# Patient Record
Sex: MALE | Race: WHITE | Employment: OTHER | URBAN - METROPOLITAN AREA
[De-identification: names, ages, dates, MRNs, and addresses within clinical notes are randomized per-mention and may not be internally consistent; named-entity substitution may affect disease eponyms.]

---

## 2017-12-21 ENCOUNTER — HOSPITAL ENCOUNTER (EMERGENCY)
Facility: CLINIC | Age: 67
Discharge: HOME | End: 2017-12-21
Attending: EMERGENCY MEDICINE | Admitting: EMERGENCY MEDICINE
Payer: COMMERCIAL

## 2017-12-21 VITALS
SYSTOLIC BLOOD PRESSURE: 136 MMHG | TEMPERATURE: 98 F | RESPIRATION RATE: 18 BRPM | HEART RATE: 100 BPM | OXYGEN SATURATION: 97 % | BODY MASS INDEX: 29.43 KG/M2 | DIASTOLIC BLOOD PRESSURE: 88 MMHG | WEIGHT: 195 LBS

## 2017-12-21 DIAGNOSIS — J20.9 ACUTE BRONCHITIS, UNSPECIFIED ORGANISM: ICD-10-CM

## 2017-12-21 DIAGNOSIS — J01.00 ACUTE MAXILLARY SINUSITIS, RECURRENCE NOT SPECIFIED: ICD-10-CM

## 2017-12-21 PROCEDURE — 99283 EMERGENCY DEPT VISIT LOW MDM: CPT

## 2017-12-21 RX ORDER — ALBUTEROL SULFATE 90 UG/1
2 AEROSOL, METERED RESPIRATORY (INHALATION) EVERY 4 HOURS PRN
Qty: 1 INHALER | Refills: 0 | Status: SHIPPED | OUTPATIENT
Start: 2017-12-21 | End: 2017-12-23

## 2017-12-21 RX ORDER — PREDNISONE 20 MG/1
40 TABLET ORAL DAILY
Qty: 8 TABLET | Refills: 0 | Status: SHIPPED | OUTPATIENT
Start: 2017-12-21 | End: 2017-12-25

## 2017-12-21 ASSESSMENT — ENCOUNTER SYMPTOMS
HEADACHES: 0
NAUSEA: 0
SINUS PRESSURE: 1
SORE THROAT: 0
RHINORRHEA: 1
VOMITING: 0
CHILLS: 0
FEVER: 0
COUGH: 1
DIARRHEA: 0

## 2017-12-21 NOTE — ED AVS SNAPSHOT
[facility] Emergency Department    [address]    [facility] [address]    Phone:  [phone]    Fax:  [phone]                                       Gavin Harper   MRN: [id number]    Department:  [facility] Emergency Department   Date of Visit:  12/21/2017           Patient Information     Date Of Birth          1950        Your diagnoses for this visit were:     Acute maxillary sinusitis, recurrence not specified     Acute bronchitis, unspecified organism        You were seen by Chris Meza MD.      Follow-up Information     Follow up with Jamin Aquino MD In 1 week.    Specialty:  Family Practice    Why:  if no improvement    Contact information:    [address]  [facility] [address] [phone]        24 Hour Appointment Hotline       To make an appointment at any [facility], call [phone] ([phone]). If you don't have a family doctor or clinic, we will help you find one. [place] clinics are conveniently located to serve the needs of you and your family.             Review of your medicines      START taking        Dose / Directions Last dose taken    albuterol 108 (90 BASE) MCG/ACT Inhaler   Commonly known as:  PROAIR HFA/PROVENTIL HFA/VENTOLIN HFA   Dose:  2 puff   Quantity:  1 Inhaler        Inhale 2 puffs into the lungs every 4 hours as needed for shortness of breath / dyspnea or wheezing   Refills:  0        amoxicillin-clavulanate 875-125 MG per tablet   Commonly known as:  AUGMENTIN   Dose:  1 tablet   Quantity:  20 tablet        Take 1 tablet by mouth 2 times daily for 10 days   Refills:  0        predniSONE 20 MG tablet   Commonly known as:  DELTASONE   Dose:  40 mg   Quantity:  8 tablet        Take 2 tablets (40 mg) by mouth daily for 4 days   Refills:  0          Our records show that you are taking the medicines listed below. If these are incorrect, please call your family doctor or clinic.         Dose / Directions Last dose taken    aspirin 325 MG tablet        1 tab everyother day   Refills:  0        glimepiride 2 MG tablet   Commonly known as:  AMARYL   Dose:  1 tablet   Quantity:  90 tablet        Take 1 tablet by mouth every morning (before breakfast).   Refills:  3        metFORMIN 1000 MG tablet   Commonly known as:  GLUCOPHAGE   Quantity:  90 tablet        Take in am   Refills:  3        propranolol HCl 60 MG Tabs   Quantity:  10 tablet        TAKE 1 TABLET BY MOUTH EVERY DAY AS NEEDED   Refills:  2        triamterene-hydrochlorothiazide 37.5-25 MG per capsule   Commonly known as:  DYAZIDE   Dose:  1 capsule        Take 1 capsule by mouth daily.   Refills:  0                Prescriptions were sent or printed at these locations (3 Prescriptions)                   Other Prescriptions                Printed at Department/Unit printer (3 of 3)         amoxicillin-clavulanate (AUGMENTIN) 875-125 MG per tablet               predniSONE (DELTASONE) 20 MG tablet               albuterol (PROAIR HFA/PROVENTIL HFA/VENTOLIN HFA) 108 (90 BASE) MCG/ACT Inhaler                Orders Needing Specimen Collection     None      Pending Results     No orders found from 12/19/2017 to 12/22/2017.            Pending Culture Results     No orders found from 12/19/2017 to 12/22/2017.            Pending Results Instructions     If you had any lab results that were not finalized at the time of your Discharge, you can call the ED Lab Result RN at [phone]. You will be contacted by this team for any positive Lab results or changes in treatment. The nurses are available 7 days a week from 10A to 6:30P.  You can leave a message 24 hours per day and they will return your call.        Test Results From Your Hospital Stay               Clinical Quality Measure: Blood Pressure Screening     Your blood pressure was checked while you were in the emergency department today. The last reading we obtained was  BP: (!) 144/93 . Please  "read the guidelines below about what these numbers mean and what you should do about them.  If your systolic blood pressure (the top number) is less than 120 and your diastolic blood pressure (the bottom number) is less than 80, then your blood pressure is normal. There is nothing more that you need to do about it.  If your systolic blood pressure (the top number) is 120-139 or your diastolic blood pressure (the bottom number) is 80-89, your blood pressure may be higher than it should be. You should have your blood pressure rechecked within a year by a primary care provider.  If your systolic blood pressure (the top number) is 140 or greater or your diastolic blood pressure (the bottom number) is 90 or greater, you may have high blood pressure. High blood pressure is treatable, but if left untreated over time it can put you at risk for heart attack, stroke, or kidney failure. You should have your blood pressure rechecked by a primary care provider within the next 4 weeks.  If your provider in the emergency department today gave you specific instructions to follow-up with your doctor or provider even sooner than that, you should follow that instruction and not wait for up to 4 weeks for your follow-up visit.        Thank you for choosing [facility]       Thank you for choosing [facility] for your care. Our goal is always to provide you with excellent care. Hearing back from our patients is one way we can continue to improve our services. Please take a few minutes to complete the written survey that you may receive in the mail after you visit with us. Thank you!        [facility] Information     [facility] lets you send messages to your doctor, view your test results, renew your prescriptions, schedule appointments and more. To sign up, go to [email or web address] . Click on \"Log in\" on the left side of the screen, which will take you to the Welcome page. Then click on \"Sign up Now\" on the right side of the page.     You " will be asked to enter the access code listed below, as well as some personal information. Please follow the directions to create your username and password.     Your access code is: [email or web address]  Expires: 3/21/2018  4:32 AM     Your access code will  in 90 days. If you need help or a new code, please call your [facility] clinic or [phone].        Care EveryWhere ID     This is your Care EveryWhere ID. This could be used by other organizations to access your [facility] medical records  [id number]        Equal Access to Services     [facility]: Cale Ann, andry walters, paris encinas, melvin gutierrez . So [facility] [phone].    ATENCIÓN: Si habla español, tiene a delaney disposición servicios gratuitos de asistencia lingüística. Charu al [phone].    We comply with applicable federal civil rights laws and [place] laws. We do not discriminate on the basis of race, color, national origin, age, disability, sex, sexual orientation, or gender identity.            After Visit Summary       This is your record. Keep this with you and show to your community pharmacist(s) and doctor(s) at your next visit.

## 2017-12-21 NOTE — ED PROVIDER NOTES
History     Chief Complaint:  Sinus Congestion     HPI   Gavin Harper is a 67-year-old male with a history of diabetes and hypertension who presents with cough, sinus congestion, and overall feeling unwell.  The patient reports that he has had bronchitis frequently and states that his current symptoms have been ongoing for 2 months.  He states that he thought this was bronchitis again and he would just let it run its course; however, he presents tonight as his sinuses are now congested and he has had brownish discharge from his nose.  The patient denies any fever or chills.  He states that his cough is better but is still present. He has had no vomiting, headache, ear pain, or severe sore throat.    Allergies:  No known drug allergies.      Medications:    Dyazide   Propranolol   Amaryl   Metformin     Past Medical History:    Dermoid cyst orf trunk   Diabetes   Hypertension     Past Surgical History:    Tonsillectomy    Pilonidal lesion removal     Family History:    Colorectal cancer     Social History:  Marital Status:    Presents to the ED alone  Tobacco Use: Former smoker of 37 years, 0.75 PPD.   Alcohol Use: Yes   PCP: Jamin Aquino     Review of Systems   Constitutional: Negative for chills and fever.   HENT: Positive for congestion, rhinorrhea and sinus pressure. Negative for ear pain and sore throat.    Respiratory: Positive for cough.    Gastrointestinal: Negative for diarrhea, nausea and vomiting.   Neurological: Negative for headaches.   All other systems reviewed and are negative.    Physical Exam   First Vitals:  BP: (!) 122/93  Pulse: 120  Temp: 98  F (36.7  C)  Resp: 22  Weight: 88.5 kg (195 lb)  SpO2: 98 %    Physical Exam  Vital signs and nursing notes reviewed.     Constitutional: laying on gurney appears comfortable  HENT: Oropharynx is clear and moist, no facial swelling, no evidence of mastoiditis.  Maxillary sinus tenderness to palpation.  Eyes: Conjunctivae are normal  bilaterally. Pupils equal.  EOM's normal  Neck: normal range of motion  Cardiovascular: mild tachycardic rate at 106 bpm on my exam, regular rhythm, normal heart sounds.   Pulmonary/Chest: Effort normall. No respiratory distress. Occasional forced expiratory wheezing with cough.  No rales or rhonchi  Abdominal: Soft. Bowel sounds are normal. No tenderness to palpation. No rebound or guarding.   Musculoskeletal: No joint swelling or edema.   Neurological: Alert and oriented. No focal weakness  Skin: Skin is warm and dry. No rash noted.   Psych: normal affect    Emergency Department Course   Nursing notes and vitals reviewed.    (0403) I entered the room with my scribe, obtained the history, and performed an exam of the patient as documented above.    Findings and plan explained to the patient. Patient discharged home with instructions regarding supportive care, medications, and reasons to return. The importance of close follow-up was reviewed. The patient was prescribed Augmentin, prednisone, and an albuterol inhaler.    Impression & Plan    Medical Decision Making:  Gavin Harper is a 67-year-old male who presents with sinus congestion symptoms and occasional cough.  On examination he had no significant hypoxia or lung sounds to suggest pneumonia.  He did have occasional end expiratory wheeze with cough consistent with bronchitis.  Patient also has signs of maxillary sinusitis on examination.  He has no concerning findings for meningitis, mastoiditis, significant throat infection, or other concerning symptoms.  I discussed with him at length about the findings and the plan of treatment with Augmentin based on the duration of his sinusitis symptoms as well as prednisone and an inhaler if it is needed for probable mild bronchitis.  There is no need for further testing at this time.  Patient can be safely discharged to home.    Diagnosis:    ICD-10-CM   1. Acute maxillary sinusitis, recurrence not specified J01.00   2.  Acute bronchitis, unspecified organism J20.9     Disposition:  discharged to home    Discharge Medications:  New Prescriptions    ALBUTEROL (PROAIR HFA/PROVENTIL HFA/VENTOLIN HFA) 108 (90 BASE) MCG/ACT INHALER    Inhale 2 puffs into the lungs every 4 hours as needed for shortness of breath / dyspnea or wheezing    AMOXICILLIN-CLAVULANATE (AUGMENTIN) 875-125 MG PER TABLET    Take 1 tablet by mouth 2 times daily for 10 days    PREDNISONE (DELTASONE) 20 MG TABLET    Take 2 tablets (40 mg) by mouth daily for 4 days         [facility] EMERGENCY DEPARTMENT      Scribe disclosure:  IAki, am serving as a scribe on 12/21/2017 at 4:03 AM to personally document services performed by Dr. Meza based on my observations and the provider's statements to me.               Chris Meza MD  12/21/17 [id number]

## 2017-12-21 NOTE — ED AVS SNAPSHOT
[facility] Emergency Department    [address]    [facility] [address]    Phone:  [phone]    Fax:  [phone]                                       Gavin Harper   MRN: [id number]    Department:  [facility] Emergency Department   Date of Visit:  12/21/2017           After Visit Summary Signature Page     I have received my discharge instructions, and my questions have been answered. I have discussed any challenges I see with this plan with the nurse or doctor.    ..........................................................................................................................................  Patient/Patient Representative Signature      ..........................................................................................................................................  Patient Representative Print Name and Relationship to Patient    ..................................................               ................................................  Date                                            Time    ..........................................................................................................................................  Reviewed by Signature/Title    ...................................................              ..............................................  Date                                                            Time

## 2017-12-21 NOTE — ED NOTES
Pt comes in with this  Nasal congestion has been going on for a month was worried snot used to be greenish now brown  No fever or chills   Coughing at times   Here for eval